# Patient Record
Sex: FEMALE | Race: WHITE | NOT HISPANIC OR LATINO | URBAN - METROPOLITAN AREA
[De-identification: names, ages, dates, MRNs, and addresses within clinical notes are randomized per-mention and may not be internally consistent; named-entity substitution may affect disease eponyms.]

---

## 2020-07-25 ENCOUNTER — TELEPHONE ENCOUNTER (OUTPATIENT)
Dept: URBAN - METROPOLITAN AREA CLINIC 13 | Facility: CLINIC | Age: 60
End: 2020-07-25

## 2020-07-25 RX ORDER — SODIUM SULFATE, POTASSIUM SULFATE, MAGNESIUM SULFATE 17.5; 3.13; 1.6 G/ML; G/ML; G/ML
5PM THE DAY BEFORE PROCEDURE, DRINK 1/2 OF PREP, THEN 6HOURS BEFORE PROCEDURE DRINK REMAINDER OF PREP SOLUTION, CONCENTRATE ORAL
Qty: 1 | Refills: 0 | OUTPATIENT
Start: 2019-03-18 | End: 2019-03-22

## 2020-07-25 RX ORDER — VALACYCLOVIR HYDROCHLORIDE 1 G/1
TAKE 1 TABLET AS NEEDED TABLET, FILM COATED ORAL
Refills: 0 | OUTPATIENT
Start: 2009-07-01 | End: 2018-01-23

## 2020-07-25 RX ORDER — DICYCLOMINE HYDROCHLORIDE 10 MG/1
TAKE ONE CAPSULE BY MOUTH EVERY 6 HOURS AS NEEDED FOR ABDOMINAL PAIN CAPSULE ORAL
Qty: 30 | Refills: 1 | OUTPATIENT
Start: 2018-11-08 | End: 2019-02-07

## 2020-07-25 RX ORDER — LANSOPRAZOLE 30 MG/1
TAKE 1 CAPSULE EVERY 12 HOURS CAPSULE, DELAYED RELEASE ORAL
Refills: 0 | OUTPATIENT
Start: 2009-07-01 | End: 2018-01-23

## 2020-07-25 RX ORDER — SODIUM SULFATE, POTASSIUM SULFATE, MAGNESIUM SULFATE 17.5; 3.13; 1.6 G/ML; G/ML; G/ML
DILUTE CONTENTS AND USE AS DIRECTED FOR BOWEL PREP SOLUTION, CONCENTRATE ORAL
Qty: 1 | Refills: 0 | OUTPATIENT
Start: 2019-02-07 | End: 2019-03-18

## 2020-07-25 RX ORDER — ASCORBIC ACID 500 MG
TAKE  TABLET DAILY TABLET ORAL
Refills: 0 | OUTPATIENT
End: 2018-11-08

## 2020-07-26 ENCOUNTER — TELEPHONE ENCOUNTER (OUTPATIENT)
Dept: URBAN - METROPOLITAN AREA CLINIC 13 | Facility: CLINIC | Age: 60
End: 2020-07-26

## 2020-07-26 RX ORDER — HYDROXYCHLOROQUINE SULFATE 200 MG/1
TAKE 1 TABLET TWICE DAILY WITH FOOD TABLET, FILM COATED ORAL
Refills: 0 | Status: ACTIVE | COMMUNITY

## 2020-07-26 RX ORDER — ESTRADIOL 0.07 MG/D
APPLY 1 PATCH TWICE WEEKLY AS DIRECTED PATCH, EXTENDED RELEASE TRANSDERMAL
Refills: 0 | Status: ACTIVE | COMMUNITY

## 2020-07-26 RX ORDER — TEMAZEPAM 30 MG/1
TAKE 1 CAPSULE AT BEDTIME AS NEEDED FOR SLEEP CAPSULE ORAL
Refills: 0 | Status: ACTIVE | COMMUNITY

## 2020-07-26 RX ORDER — LANSOPRAZOLE 30 MG/1
TAKE ONE CAPSULE BY MOUTH TWICE A DAY CAPSULE, DELAYED RELEASE ORAL
Qty: 180 | Refills: 1 | Status: ACTIVE | COMMUNITY
Start: 2017-12-01

## 2020-07-26 RX ORDER — CITALOPRAM 20 MG/1
TAKE ONE TABLET BY MOUTH EVERY DAY AS DIRECTED TABLET, FILM COATED ORAL
Refills: 1 | Status: ACTIVE | COMMUNITY
Start: 2009-07-01

## 2021-02-17 ENCOUNTER — OFFICE VISIT (OUTPATIENT)
Dept: URBAN - METROPOLITAN AREA CLINIC 113 | Facility: CLINIC | Age: 61
End: 2021-02-17

## 2024-06-11 ENCOUNTER — WEB ENCOUNTER (OUTPATIENT)
Dept: URBAN - METROPOLITAN AREA CLINIC 113 | Facility: CLINIC | Age: 64
End: 2024-06-11

## 2024-07-02 ENCOUNTER — OFFICE VISIT (OUTPATIENT)
Dept: URBAN - METROPOLITAN AREA CLINIC 113 | Facility: CLINIC | Age: 64
End: 2024-07-02

## 2024-08-12 ENCOUNTER — OFFICE VISIT (OUTPATIENT)
Dept: URBAN - METROPOLITAN AREA CLINIC 113 | Facility: CLINIC | Age: 64
End: 2024-08-12
Payer: MEDICARE

## 2024-08-12 ENCOUNTER — DASHBOARD ENCOUNTERS (OUTPATIENT)
Age: 64
End: 2024-08-12

## 2024-08-12 ENCOUNTER — OFFICE VISIT (OUTPATIENT)
Dept: URBAN - METROPOLITAN AREA CLINIC 113 | Facility: CLINIC | Age: 64
End: 2024-08-12

## 2024-08-12 ENCOUNTER — TELEPHONE ENCOUNTER (OUTPATIENT)
Dept: URBAN - METROPOLITAN AREA CLINIC 113 | Facility: CLINIC | Age: 64
End: 2024-08-12

## 2024-08-12 VITALS
BODY MASS INDEX: 23.58 KG/M2 | HEART RATE: 68 BPM | RESPIRATION RATE: 12 BRPM | TEMPERATURE: 97.3 F | HEIGHT: 70 IN | WEIGHT: 164.7 LBS | SYSTOLIC BLOOD PRESSURE: 134 MMHG | DIASTOLIC BLOOD PRESSURE: 79 MMHG

## 2024-08-12 DIAGNOSIS — K57.50 DIVERTICULOSIS OF BOTH SMALL AND LARGE INTESTINE WITHOUT PERFORATION OR ABSCESS WITHOUT BLEEDING: ICD-10-CM

## 2024-08-12 DIAGNOSIS — K20.0 EOSINOPHILIC ESOPHAGITIS: ICD-10-CM

## 2024-08-12 DIAGNOSIS — K58.8 OTHER IRRITABLE BOWEL SYNDROME: ICD-10-CM

## 2024-08-12 DIAGNOSIS — K21.00 GASTROESOPHAGEAL REFLUX DISEASE WITH ESOPHAGITIS WITHOUT HEMORRHAGE: ICD-10-CM

## 2024-08-12 PROBLEM — 397881000: Status: ACTIVE | Noted: 2024-08-12

## 2024-08-12 PROBLEM — 266433003: Status: ACTIVE | Noted: 2024-08-12

## 2024-08-12 PROBLEM — 440630006: Status: ACTIVE | Noted: 2024-08-12

## 2024-08-12 PROBLEM — 235599003: Status: ACTIVE | Noted: 2024-08-12

## 2024-08-12 PROCEDURE — 99203 OFFICE O/P NEW LOW 30 MIN: CPT | Performed by: INTERNAL MEDICINE

## 2024-08-12 PROCEDURE — 99213 OFFICE O/P EST LOW 20 MIN: CPT | Performed by: INTERNAL MEDICINE

## 2024-08-12 RX ORDER — HYDROXYCHLOROQUINE SULFATE 200 MG/1
TAKE 1 TABLET TWICE DAILY WITH FOOD TABLET, FILM COATED ORAL
Refills: 0 | Status: ACTIVE | COMMUNITY

## 2024-08-12 RX ORDER — LANSOPRAZOLE 30 MG/1
TAKE ONE CAPSULE BY MOUTH TWICE A DAY CAPSULE, DELAYED RELEASE ORAL
Qty: 180 | Refills: 1 | Status: ACTIVE | COMMUNITY
Start: 2017-12-01

## 2024-08-12 RX ORDER — TEMAZEPAM 30 MG/1
TAKE 1 CAPSULE AT BEDTIME AS NEEDED FOR SLEEP CAPSULE ORAL
Refills: 0 | Status: ACTIVE | COMMUNITY

## 2024-08-12 RX ORDER — ESTRADIOL 0.07 MG/D
APPLY 1 PATCH TWICE WEEKLY AS DIRECTED PATCH, EXTENDED RELEASE TRANSDERMAL
Refills: 0 | Status: ACTIVE | COMMUNITY

## 2024-08-12 RX ORDER — CITALOPRAM 20 MG/1
TAKE ONE TABLET BY MOUTH EVERY DAY AS DIRECTED TABLET, FILM COATED ORAL
Refills: 1 | Status: ACTIVE | COMMUNITY
Start: 2009-07-01

## 2024-08-12 RX ORDER — AMITRIPTYLINE HYDROCHLORIDE 10 MG/1
1 TABLET AT BEDTIME TABLET, FILM COATED ORAL ONCE A DAY
Qty: 30 TABLET | Refills: 3
Start: 2024-08-12

## 2024-08-12 RX ORDER — AMITRIPTYLINE HYDROCHLORIDE 10 MG/1
1 TABLET AT BEDTIME TABLET, FILM COATED ORAL ONCE A DAY
Qty: 30 TABLET | Refills: 3 | OUTPATIENT
Start: 2024-08-12

## 2024-08-12 NOTE — HPI-TODAY'S VISIT:
Patient is a very pleasant 63-year-old female who returns after long interval absence.  Patient was initially seen back in 2009 by Dr. Mariscal for rectal pain and constipation.  She then was seen as a new patient in 2017 by myself for acid reflux and epigastric pain.  She was last seen in the office in February 2019 for generalized abdominal pain and constipation. Last colonoscopy was March 2019.  This was for screening purposes.  It revealed diverticulosis.  The exam was without difficulty.  10-year follow-up was recommended.  Last upper endoscopy was in January 2018.  This was for acid reflux.  This revealed gastric polyps.  Mild furrowing of the distal esophagus.  Biopsies of the stomach revealed proton pump inhibitor effect and fundic gland polyps.  Esophageal biopsies were consistent with eosinophilic esophagitis with 18 eosinophils per high-power field. Referring records were reviewed.  Laboratory testing from May of this year revealed a normal CBC with no increased eosinophils, normal CMP except for glucose of 108.  Normal amylase normal lipase. The patient states her main complaint is intermittent epigastric discomfort and alternating diarrhea and constipation.  She had been on Metamucil for many years doing well but friends told her that Metamucil was bad for her and she discontinued it.  She says she also has very poor sleep.  She has been taking Ambien recently.  This gives her very bad dreams.  She brings with her pelvic and upper abdominal ultrasound reports which are normal.  She does eat a fair amount of eggs.  Not much in the way of shellfish or nuts. She states she has had no new significant past medical or past surgical history in the last 5 years since have seen her.

## 2024-08-19 ENCOUNTER — TELEPHONE ENCOUNTER (OUTPATIENT)
Dept: URBAN - METROPOLITAN AREA CLINIC 113 | Facility: CLINIC | Age: 64
End: 2024-08-19

## 2024-08-19 RX ORDER — AMITRIPTYLINE HYDROCHLORIDE 25 MG/1
1 TABLET AT BEDTIME TABLET, FILM COATED ORAL ONCE A DAY
Qty: 30 TABLET | Refills: 4 | OUTPATIENT
Start: 2024-08-19